# Patient Record
Sex: MALE | Race: WHITE | NOT HISPANIC OR LATINO | URBAN - METROPOLITAN AREA
[De-identification: names, ages, dates, MRNs, and addresses within clinical notes are randomized per-mention and may not be internally consistent; named-entity substitution may affect disease eponyms.]

---

## 2017-12-06 ENCOUNTER — EMERGENCY (EMERGENCY)
Facility: HOSPITAL | Age: 62
LOS: 1 days | Discharge: HOME | End: 2017-12-06
Attending: EMERGENCY MEDICINE
Payer: COMMERCIAL

## 2017-12-06 VITALS
RESPIRATION RATE: 16 BRPM | HEIGHT: 76 IN | WEIGHT: 225.09 LBS | DIASTOLIC BLOOD PRESSURE: 96 MMHG | OXYGEN SATURATION: 95 % | SYSTOLIC BLOOD PRESSURE: 154 MMHG | HEART RATE: 84 BPM | TEMPERATURE: 98 F

## 2017-12-06 VITALS
OXYGEN SATURATION: 98 % | TEMPERATURE: 98 F | RESPIRATION RATE: 16 BRPM | SYSTOLIC BLOOD PRESSURE: 121 MMHG | HEART RATE: 74 BPM | DIASTOLIC BLOOD PRESSURE: 79 MMHG

## 2017-12-06 LAB
ALBUMIN SERPL ELPH-MCNC: 2.8 G/DL — LOW (ref 3.5–5)
ALP SERPL-CCNC: 78 U/L — SIGNIFICANT CHANGE UP (ref 40–120)
ALT FLD-CCNC: 30 U/L DA — SIGNIFICANT CHANGE UP (ref 10–60)
ANION GAP SERPL CALC-SCNC: 8 MMOL/L — SIGNIFICANT CHANGE UP (ref 5–17)
APPEARANCE UR: CLEAR — SIGNIFICANT CHANGE UP
AST SERPL-CCNC: 32 U/L — SIGNIFICANT CHANGE UP (ref 10–40)
BILIRUB SERPL-MCNC: 1.7 MG/DL — HIGH (ref 0.2–1.2)
BILIRUB UR-MCNC: ABNORMAL
BUN SERPL-MCNC: 18 MG/DL — SIGNIFICANT CHANGE UP (ref 7–18)
CALCIUM SERPL-MCNC: 8.7 MG/DL — SIGNIFICANT CHANGE UP (ref 8.4–10.5)
CHLORIDE SERPL-SCNC: 109 MMOL/L — HIGH (ref 96–108)
CO2 SERPL-SCNC: 23 MMOL/L — SIGNIFICANT CHANGE UP (ref 22–31)
COLOR SPEC: ABNORMAL
CREAT SERPL-MCNC: 1.05 MG/DL — SIGNIFICANT CHANGE UP (ref 0.5–1.3)
DIFF PNL FLD: ABNORMAL
GLUCOSE SERPL-MCNC: 122 MG/DL — HIGH (ref 70–99)
GLUCOSE UR QL: NEGATIVE — SIGNIFICANT CHANGE UP
HCT VFR BLD CALC: 46.7 % — SIGNIFICANT CHANGE UP (ref 39–50)
HGB BLD-MCNC: 15.6 G/DL — SIGNIFICANT CHANGE UP (ref 13–17)
KETONES UR-MCNC: ABNORMAL
LEUKOCYTE ESTERASE UR-ACNC: ABNORMAL
MCHC RBC-ENTMCNC: 33.2 PG — SIGNIFICANT CHANGE UP (ref 27–34)
MCHC RBC-ENTMCNC: 33.4 GM/DL — SIGNIFICANT CHANGE UP (ref 32–36)
MCV RBC AUTO: 99.2 FL — SIGNIFICANT CHANGE UP (ref 80–100)
NITRITE UR-MCNC: NEGATIVE — SIGNIFICANT CHANGE UP
PH UR: 6 — SIGNIFICANT CHANGE UP (ref 5–8)
PLATELET # BLD AUTO: 106 K/UL — LOW (ref 150–400)
POTASSIUM SERPL-MCNC: 3.9 MMOL/L — SIGNIFICANT CHANGE UP (ref 3.5–5.3)
POTASSIUM SERPL-SCNC: 3.9 MMOL/L — SIGNIFICANT CHANGE UP (ref 3.5–5.3)
PROT SERPL-MCNC: 6.6 G/DL — SIGNIFICANT CHANGE UP (ref 6–8.3)
PROT UR-MCNC: 100
RBC # BLD: 4.7 M/UL — SIGNIFICANT CHANGE UP (ref 4.2–5.8)
RBC # FLD: 12.2 % — SIGNIFICANT CHANGE UP (ref 10.3–14.5)
SODIUM SERPL-SCNC: 140 MMOL/L — SIGNIFICANT CHANGE UP (ref 135–145)
SP GR SPEC: 1.02 — SIGNIFICANT CHANGE UP (ref 1.01–1.02)
UROBILINOGEN FLD QL: 8
WBC # BLD: 10.9 K/UL — HIGH (ref 3.8–10.5)
WBC # FLD AUTO: 10.9 K/UL — HIGH (ref 3.8–10.5)

## 2017-12-06 PROCEDURE — 87086 URINE CULTURE/COLONY COUNT: CPT

## 2017-12-06 PROCEDURE — 96374 THER/PROPH/DIAG INJ IV PUSH: CPT

## 2017-12-06 PROCEDURE — 81001 URINALYSIS AUTO W/SCOPE: CPT

## 2017-12-06 PROCEDURE — 74176 CT ABD & PELVIS W/O CONTRAST: CPT | Mod: 26

## 2017-12-06 PROCEDURE — 74176 CT ABD & PELVIS W/O CONTRAST: CPT

## 2017-12-06 PROCEDURE — 51702 INSERT TEMP BLADDER CATH: CPT

## 2017-12-06 PROCEDURE — 85027 COMPLETE CBC AUTOMATED: CPT

## 2017-12-06 PROCEDURE — 99284 EMERGENCY DEPT VISIT MOD MDM: CPT | Mod: 25

## 2017-12-06 PROCEDURE — 99285 EMERGENCY DEPT VISIT HI MDM: CPT | Mod: 25

## 2017-12-06 PROCEDURE — 80053 COMPREHEN METABOLIC PANEL: CPT

## 2017-12-06 RX ORDER — CEFTRIAXONE 500 MG/1
1 INJECTION, POWDER, FOR SOLUTION INTRAMUSCULAR; INTRAVENOUS ONCE
Qty: 0 | Refills: 0 | Status: COMPLETED | OUTPATIENT
Start: 2017-12-06 | End: 2017-12-06

## 2017-12-06 RX ORDER — CEFTRIAXONE 500 MG/1
1 INJECTION, POWDER, FOR SOLUTION INTRAMUSCULAR; INTRAVENOUS EVERY 24 HOURS
Qty: 0 | Refills: 0 | Status: DISCONTINUED | OUTPATIENT
Start: 2017-12-07 | End: 2017-12-10

## 2017-12-06 RX ORDER — MOXIFLOXACIN HYDROCHLORIDE TABLETS, 400 MG 400 MG/1
1 TABLET, FILM COATED ORAL
Qty: 14 | Refills: 1 | OUTPATIENT
Start: 2017-12-06 | End: 2017-12-19

## 2017-12-06 RX ORDER — CEFTRIAXONE 500 MG/1
INJECTION, POWDER, FOR SOLUTION INTRAMUSCULAR; INTRAVENOUS
Qty: 0 | Refills: 0 | Status: DISCONTINUED | OUTPATIENT
Start: 2017-12-06 | End: 2017-12-10

## 2017-12-06 RX ORDER — SODIUM CHLORIDE 9 MG/ML
1000 INJECTION INTRAMUSCULAR; INTRAVENOUS; SUBCUTANEOUS ONCE
Qty: 0 | Refills: 0 | Status: COMPLETED | OUTPATIENT
Start: 2017-12-06 | End: 2017-12-06

## 2017-12-06 RX ADMIN — SODIUM CHLORIDE 1000 MILLILITER(S): 9 INJECTION INTRAMUSCULAR; INTRAVENOUS; SUBCUTANEOUS at 04:35

## 2017-12-06 RX ADMIN — CEFTRIAXONE 100 GRAM(S): 500 INJECTION, POWDER, FOR SOLUTION INTRAMUSCULAR; INTRAVENOUS at 04:35

## 2017-12-06 NOTE — ED PROVIDER NOTE - MEDICAL DECISION MAKING DETAILS
Pt w/ urinary retention recently dx'd w/ UTI. Will do labs, UA, CT abd and pelvis to r/o stone. Will do prostate exam. Very well appearing, likely to go home w/ Urology follow up. Pt w/ urinary retention recently dx'd w/ UTI. Will do labs, UA, CT abd and pelvis to r/o stone.  Very well appearing, likely to go home w/ Urology follow up.

## 2017-12-06 NOTE — ED PROVIDER NOTE - OBJECTIVE STATEMENT
63 y/o M w/ no PMhx c/o urinary retention. Went to [facility] where he got a UA and was dx w/ UTI. He has taken 2 full days of Bactrim w/ no relief. Last void was 8 hours ago. Pt reports associated fever of 103. Pt denies nausea,vomiting,diarrhea, prostate issues. Had some difficulty w/ bowel movements. Pt denies any other complaints. NKDA.

## 2017-12-06 NOTE — ED PROVIDER NOTE - PROGRESS NOTE DETAILS
feeling much better after santos, no pain now, very well appearing, will d/c w leg bag/ follow up, cipro for UTI/prostatitis

## 2017-12-06 NOTE — ED ADULT NURSE NOTE - OBJECTIVE STATEMENT
Pt. is aox3 came to er accompanied by his wife c/o difficulty urinating. pt was seen by attending Kiser placed 600ml tea colored urine in bag. urine specimen sent to lab.

## 2017-12-06 NOTE — ED ADULT NURSE REASSESSMENT NOTE - NS ED NURSE REASSESS COMMENT FT1
pt. started on antibiotic therapy also 1 liter of ns in progress labs sent. pt tolerating antibiotic well no adverse reaction noted

## 2017-12-07 LAB
CULTURE RESULTS: NO GROWTH — SIGNIFICANT CHANGE UP
SPECIMEN SOURCE: SIGNIFICANT CHANGE UP

## 2022-07-27 ENCOUNTER — OFFICE (OUTPATIENT)
Dept: URBAN - METROPOLITAN AREA CLINIC 92 | Facility: CLINIC | Age: 67
Setting detail: OPHTHALMOLOGY
End: 2022-07-27
Payer: MEDICARE

## 2022-07-27 DIAGNOSIS — H25.13: ICD-10-CM

## 2022-07-27 DIAGNOSIS — H35.40: ICD-10-CM

## 2022-07-27 DIAGNOSIS — H52.7: ICD-10-CM

## 2022-07-27 DIAGNOSIS — H43.812: ICD-10-CM

## 2022-07-27 PROCEDURE — 92250 FUNDUS PHOTOGRAPHY W/I&R: CPT | Performed by: OPHTHALMOLOGY

## 2022-07-27 PROCEDURE — 99204 OFFICE O/P NEW MOD 45 MIN: CPT | Performed by: OPHTHALMOLOGY

## 2022-07-27 ASSESSMENT — REFRACTION_MANIFEST
OD_ADD: +2.50
OS_AXIS: 20
OD_CYLINDER: +1.00
OS_ADD: +2.50
OS_VA1: 20/20
OD_AXIS: 15
OS_SPHERE: -4.00
OD_VA1: 20/20
OS_CYLINDER: +0.50
OD_SPHERE: -2.25

## 2022-07-27 ASSESSMENT — REFRACTION_AUTOREFRACTION
OD_CYLINDER: +1.25
OD_SPHERE: -2.50
OS_AXIS: 23
OD_AXIS: 15
OS_CYLINDER: +0.50
OS_SPHERE: -5.00

## 2022-07-27 ASSESSMENT — VISUAL ACUITY
OD_BCVA: 20/100
OS_BCVA: 20/20

## 2022-07-27 ASSESSMENT — REFRACTION_CURRENTRX
OD_CYLINDER: +0.75
OS_AXIS: 10
OS_CYLINDER: +1.00
OD_SPHERE: -2.00
OS_SPHERE: -1.25
OS_SPHERE: -1.25
OS_OVR_VA: 20/
OS_AXIS: 173
OD_OVR_VA: 20/
OS_CYLINDER: +0.75
OD_OVR_VA: 20/
OD_SPHERE: -3.50
OD_AXIS: 178
OD_AXIS: 14
OS_OVR_VA: 20/
OD_CYLINDER: +1.25

## 2022-07-27 ASSESSMENT — KERATOMETRY
OD_K1POWER_DIOPTERS: 42.25
OS_AXISANGLE_DEGREES: 69
OD_AXISANGLE_DEGREES: 53
OS_K1POWER_DIOPTERS: 42.00
OS_K2POWER_DIOPTERS: 42.50
OD_K2POWER_DIOPTERS: 42.50

## 2022-07-27 ASSESSMENT — AXIALLENGTH_DERIVED
OS_AL: 25.6822
OD_AL: 24.7939
OD_AL: 24.7402
OS_AL: 26.1523

## 2022-07-27 ASSESSMENT — SPHEQUIV_DERIVED
OS_SPHEQUIV: -4.75
OD_SPHEQUIV: -1.75
OS_SPHEQUIV: -3.75
OD_SPHEQUIV: -1.875

## 2022-07-27 ASSESSMENT — CONFRONTATIONAL VISUAL FIELD TEST (CVF)
OS_FINDINGS: FULL
OD_FINDINGS: FULL

## 2022-07-27 ASSESSMENT — TONOMETRY
OD_IOP_MMHG: 12
OS_IOP_MMHG: 14

## 2023-02-17 ENCOUNTER — OFFICE (OUTPATIENT)
Dept: URBAN - METROPOLITAN AREA CLINIC 92 | Facility: CLINIC | Age: 68
Setting detail: OPHTHALMOLOGY
End: 2023-02-17
Payer: MEDICARE

## 2023-02-17 DIAGNOSIS — H25.13: ICD-10-CM

## 2023-02-17 DIAGNOSIS — H43.812: ICD-10-CM

## 2023-02-17 DIAGNOSIS — H35.40: ICD-10-CM

## 2023-02-17 DIAGNOSIS — H25.12: ICD-10-CM

## 2023-02-17 DIAGNOSIS — H25.11: ICD-10-CM

## 2023-02-17 PROCEDURE — 92014 COMPRE OPH EXAM EST PT 1/>: CPT | Performed by: SPECIALIST

## 2023-02-17 PROCEDURE — 92136 OPHTHALMIC BIOMETRY: CPT | Performed by: SPECIALIST

## 2023-02-17 ASSESSMENT — REFRACTION_MANIFEST
OS_VA1: 20/20
OS_SPHERE: -4.00
OD_CYLINDER: +1.00
OS_AXIS: 20
OS_ADD: +2.50
OD_ADD: +2.50
OD_SPHERE: -2.25
OS_CYLINDER: +0.50
OD_VA1: 20/20
OD_AXIS: 15

## 2023-02-17 ASSESSMENT — KERATOMETRY
OD_AXISANGLE_DEGREES: 143
OS_CYLPOWER_DEGREES: 0.5
OD_K1K2_AVERAGE: 42.375
OD_AXISANGLE_DEGREES: 53
OD_AXISANGLE2_DEGREES: 53
METHOD_AUTO_MANUAL: AUTO
OS_K1POWER_DIOPTERS: 42.00
OD_K2POWER_DIOPTERS: 42.50
OS_AXISANGLE_DEGREES: 64
OS_K2POWER_DIOPTERS: 42.50
OS_AXISANGLE_DEGREES: 154
OS_K2POWER_DIOPTERS: 42.50
OS_K1POWER_DIOPTERS: 42.00
OS_AXISANGLE2_DEGREES: 64
OD_K2POWER_DIOPTERS: 42.50
OS_CYLAXISANGLE_DEGREES: 64
OD_CYLAXISANGLE_DEGREES: 53
OS_K1K2_AVERAGE: 42.25
OD_CYLPOWER_DEGREES: 0.25
OD_K1POWER_DIOPTERS: 42.25
OD_K1POWER_DIOPTERS: 42.25

## 2023-02-17 ASSESSMENT — REFRACTION_CURRENTRX
OS_SPHERE: -1.25
OS_CYLINDER: +1.00
OS_AXIS: 173
OD_SPHERE: -3.50
OD_CYLINDER: +0.75
OD_AXIS: 14
OD_CYLINDER: +1.25
OD_OVR_VA: 20/
OS_OVR_VA: 20/
OS_OVR_VA: 20/
OD_OVR_VA: 20/
OS_CYLINDER: +0.75
OS_AXIS: 10
OS_SPHERE: -1.25
OD_AXIS: 178
OD_SPHERE: -2.00

## 2023-02-17 ASSESSMENT — REFRACTION_AUTOREFRACTION
OD_CYLINDER: +1.25
OS_SPHERE: -5.25
OD_AXIS: 11
OD_SPHERE: -3.00
OS_CYLINDER: +0.50
OS_AXIS: 07

## 2023-02-17 ASSESSMENT — AXIALLENGTH_DERIVED
OS_AL: 25.6822
OD_AL: 25.0109
OS_AL: 26.2726
OD_AL: 24.7402

## 2023-02-17 ASSESSMENT — VISUAL ACUITY
OS_BCVA: 20/30-1
OD_BCVA: 20/100

## 2023-02-17 ASSESSMENT — TONOMETRY
OD_IOP_MMHG: 17
OS_IOP_MMHG: 18

## 2023-02-17 ASSESSMENT — SPHEQUIV_DERIVED
OS_SPHEQUIV: -3.75
OD_SPHEQUIV: -1.75
OD_SPHEQUIV: -2.375
OS_SPHEQUIV: -5

## 2023-02-17 ASSESSMENT — CONFRONTATIONAL VISUAL FIELD TEST (CVF)
OS_FINDINGS: FULL
OD_FINDINGS: FULL

## 2023-04-04 ENCOUNTER — OFFICE (OUTPATIENT)
Dept: URBAN - METROPOLITAN AREA CLINIC 92 | Facility: CLINIC | Age: 68
Setting detail: OPHTHALMOLOGY
End: 2023-04-04
Payer: MEDICARE

## 2023-04-04 DIAGNOSIS — H25.13: ICD-10-CM

## 2023-04-04 PROCEDURE — 99441 TELEPHONE EVALUATION AND MANAGEMENT SERVICE, FOR AN ESTABLISHED PATIENT, 5-10 MINUTES OF MEDICAL DISCUSSION: CPT | Performed by: SPECIALIST

## 2023-04-12 ENCOUNTER — AMBULATORY SURGERY CENTER (OUTPATIENT)
Dept: URBAN - METROPOLITAN AREA CLINIC 75 | Facility: CLINIC | Age: 68
Setting detail: OPHTHALMOLOGY
End: 2023-04-12
Payer: MEDICARE

## 2023-04-12 DIAGNOSIS — H25.12: ICD-10-CM

## 2023-04-12 PROCEDURE — 66984 XCAPSL CTRC RMVL W/O ECP: CPT | Performed by: SPECIALIST

## 2023-04-13 ENCOUNTER — RX ONLY (RX ONLY)
Age: 68
End: 2023-04-13

## 2023-04-13 ENCOUNTER — OFFICE (OUTPATIENT)
Dept: URBAN - METROPOLITAN AREA CLINIC 92 | Facility: CLINIC | Age: 68
Setting detail: OPHTHALMOLOGY
End: 2023-04-13
Payer: MEDICARE

## 2023-04-13 DIAGNOSIS — Z96.1: ICD-10-CM

## 2023-04-13 PROCEDURE — 99024 POSTOP FOLLOW-UP VISIT: CPT | Performed by: SPECIALIST

## 2023-04-13 ASSESSMENT — REFRACTION_AUTOREFRACTION
OS_AXIS: 07
OS_CYLINDER: +0.50
OS_SPHERE: -5.25
OD_CYLINDER: +1.25
OD_AXIS: 11
OD_SPHERE: -3.00

## 2023-04-13 ASSESSMENT — SPHEQUIV_DERIVED
OD_SPHEQUIV: -1.75
OS_SPHEQUIV: -3.75
OD_SPHEQUIV: -2.375
OS_SPHEQUIV: -5

## 2023-04-13 ASSESSMENT — REFRACTION_CURRENTRX
OS_SPHERE: -1.25
OS_SPHERE: -1.25
OD_CYLINDER: +1.25
OD_OVR_VA: 20/
OD_AXIS: 14
OS_AXIS: 173
OS_CYLINDER: +0.75
OD_SPHERE: -3.50
OS_OVR_VA: 20/
OS_CYLINDER: +1.00
OD_OVR_VA: 20/
OD_SPHERE: -2.00
OS_OVR_VA: 20/
OD_CYLINDER: +0.75
OS_AXIS: 10
OD_AXIS: 178

## 2023-04-13 ASSESSMENT — REFRACTION_MANIFEST
OD_VA1: 20/20
OD_AXIS: 15
OD_SPHERE: -2.25
OS_ADD: +2.50
OS_VA1: 20/20
OS_CYLINDER: +0.50
OD_CYLINDER: +1.00
OS_SPHERE: -4.00
OD_ADD: +2.50
OS_AXIS: 20

## 2023-04-13 ASSESSMENT — VISUAL ACUITY
OS_BCVA: 20/30-1
OD_BCVA: 20/25

## 2023-04-13 ASSESSMENT — KERATOMETRY
OD_K1POWER_DIOPTERS: 42.25
OD_AXISANGLE_DEGREES: 53
OS_K2POWER_DIOPTERS: 42.50
OS_AXISANGLE_DEGREES: 64
OD_K2POWER_DIOPTERS: 42.50
OS_K1POWER_DIOPTERS: 42.00
METHOD_AUTO_MANUAL: AUTO

## 2023-04-13 ASSESSMENT — CORNEAL EDEMA - FOLDS/STRIAE: OS_FOLDSSTRIAE: T

## 2023-04-13 ASSESSMENT — AXIALLENGTH_DERIVED
OS_AL: 26.2726
OD_AL: 24.7402
OD_AL: 25.0109
OS_AL: 25.6822

## 2023-04-17 ENCOUNTER — OFFICE (OUTPATIENT)
Dept: URBAN - METROPOLITAN AREA CLINIC 92 | Facility: CLINIC | Age: 68
Setting detail: OPHTHALMOLOGY
End: 2023-04-17
Payer: MEDICARE

## 2023-04-17 DIAGNOSIS — Z96.1: ICD-10-CM

## 2023-04-17 PROCEDURE — 99024 POSTOP FOLLOW-UP VISIT: CPT | Performed by: SPECIALIST

## 2023-04-17 ASSESSMENT — SPHEQUIV_DERIVED
OD_SPHEQUIV: -1.75
OD_SPHEQUIV: -2.5
OS_SPHEQUIV: -0.125
OS_SPHEQUIV: -3.75

## 2023-04-17 ASSESSMENT — REFRACTION_AUTOREFRACTION
OD_CYLINDER: +1.00
OS_AXIS: 139
OS_SPHERE: -0.25
OD_AXIS: 013
OD_SPHERE: -3.00
OS_CYLINDER: +0.25

## 2023-04-17 ASSESSMENT — REFRACTION_MANIFEST
OS_CYLINDER: +0.50
OD_AXIS: 15
OS_SPHERE: -4.00
OS_AXIS: 20
OD_CYLINDER: +1.00
OD_VA1: 20/20
OD_SPHERE: -2.25
OS_ADD: +2.50
OS_VA1: 20/20
OD_ADD: +2.50

## 2023-04-17 ASSESSMENT — REFRACTION_CURRENTRX
OD_OVR_VA: 20/
OS_CYLINDER: +1.00
OS_OVR_VA: 20/
OS_SPHERE: -1.25
OS_OVR_VA: 20/
OD_SPHERE: -3.50
OD_OVR_VA: 20/
OD_CYLINDER: +1.25
OD_CYLINDER: +0.75
OS_SPHERE: -1.25
OD_AXIS: 178
OD_AXIS: 14
OS_CYLINDER: +0.75
OD_SPHERE: -2.00
OS_AXIS: 173
OS_AXIS: 10

## 2023-04-17 ASSESSMENT — AXIALLENGTH_DERIVED
OS_AL: 25.6822
OS_AL: 24.1109
OD_AL: 24.7402
OD_AL: 25.0658

## 2023-04-17 ASSESSMENT — KERATOMETRY
OS_K1POWER_DIOPTERS: 42.00
OD_K1POWER_DIOPTERS: 42.25
METHOD_AUTO_MANUAL: AUTO
OS_AXISANGLE_DEGREES: 086
OS_K2POWER_DIOPTERS: 42.50
OD_K2POWER_DIOPTERS: 42.50
OD_AXISANGLE_DEGREES: 054

## 2023-04-17 ASSESSMENT — VISUAL ACUITY
OS_BCVA: 20/30-1
OD_BCVA: 20/20-

## 2023-04-17 ASSESSMENT — CORNEAL EDEMA - FOLDS/STRIAE: OS_FOLDSSTRIAE: T

## 2023-04-26 ENCOUNTER — AMBULATORY SURGERY CENTER (OUTPATIENT)
Dept: URBAN - METROPOLITAN AREA CLINIC 75 | Facility: CLINIC | Age: 68
Setting detail: OPHTHALMOLOGY
End: 2023-04-26
Payer: MEDICARE

## 2023-04-26 DIAGNOSIS — H25.11: ICD-10-CM

## 2023-04-26 PROCEDURE — 66984 XCAPSL CTRC RMVL W/O ECP: CPT | Performed by: SPECIALIST

## 2023-04-27 ENCOUNTER — RX ONLY (RX ONLY)
Age: 68
End: 2023-04-27

## 2023-04-27 ENCOUNTER — OFFICE (OUTPATIENT)
Dept: URBAN - METROPOLITAN AREA CLINIC 92 | Facility: CLINIC | Age: 68
Setting detail: OPHTHALMOLOGY
End: 2023-04-27
Payer: MEDICARE

## 2023-04-27 DIAGNOSIS — Z96.1: ICD-10-CM

## 2023-04-27 PROCEDURE — 99024 POSTOP FOLLOW-UP VISIT: CPT | Performed by: SPECIALIST

## 2023-04-27 ASSESSMENT — SPHEQUIV_DERIVED
OD_SPHEQUIV: -2.5
OD_SPHEQUIV: -1.75
OS_SPHEQUIV: -3.75
OS_SPHEQUIV: -0.125

## 2023-04-27 ASSESSMENT — REFRACTION_CURRENTRX
OD_SPHERE: -3.50
OS_CYLINDER: +0.75
OS_CYLINDER: +1.00
OS_SPHERE: -1.25
OS_AXIS: 173
OD_AXIS: 14
OD_OVR_VA: 20/
OD_AXIS: 178
OD_CYLINDER: +1.25
OS_OVR_VA: 20/
OS_SPHERE: -1.25
OS_OVR_VA: 20/
OD_CYLINDER: +0.75
OD_SPHERE: -2.00
OD_OVR_VA: 20/
OS_AXIS: 10

## 2023-04-27 ASSESSMENT — REFRACTION_MANIFEST
OD_SPHERE: -2.25
OS_ADD: +2.50
OD_ADD: +2.50
OD_VA1: 20/20
OS_SPHERE: -4.00
OD_CYLINDER: +1.00
OD_AXIS: 15
OS_CYLINDER: +0.50
OS_VA1: 20/20
OS_AXIS: 20

## 2023-04-27 ASSESSMENT — KERATOMETRY
OS_K2POWER_DIOPTERS: 42.50
OD_K2POWER_DIOPTERS: 42.50
METHOD_AUTO_MANUAL: AUTO
OS_K1POWER_DIOPTERS: 42.00
OD_AXISANGLE_DEGREES: 054
OS_AXISANGLE_DEGREES: 086
OD_K1POWER_DIOPTERS: 42.25

## 2023-04-27 ASSESSMENT — VISUAL ACUITY
OS_BCVA: 20/20
OD_BCVA: 20/20

## 2023-04-27 ASSESSMENT — REFRACTION_AUTOREFRACTION
OS_AXIS: 139
OS_SPHERE: -0.25
OS_CYLINDER: +0.25
OD_CYLINDER: +1.00
OD_AXIS: 013
OD_SPHERE: -3.00

## 2023-04-27 ASSESSMENT — AXIALLENGTH_DERIVED
OD_AL: 24.7402
OD_AL: 25.0658
OS_AL: 24.1109
OS_AL: 25.6822

## 2023-04-27 ASSESSMENT — CORNEAL EDEMA - FOLDS/STRIAE: OS_FOLDSSTRIAE: T

## 2023-04-27 ASSESSMENT — TONOMETRY: OD_IOP_MMHG: 19

## 2023-04-28 PROBLEM — H25.13 CATARACT SENILE NUCLEAR SCLEROSIS: Status: ACTIVE | Noted: 2023-04-04

## 2023-04-28 ASSESSMENT — KERATOMETRY
OS_K2POWER_DIOPTERS: 42.50
METHOD_AUTO_MANUAL: AUTO
OS_K1POWER_DIOPTERS: 42.00
OD_K2POWER_DIOPTERS: 42.50
OD_AXISANGLE_DEGREES: 054
OD_K1POWER_DIOPTERS: 42.25
OS_AXISANGLE_DEGREES: 086

## 2023-04-28 ASSESSMENT — REFRACTION_AUTOREFRACTION
OD_SPHERE: -3.00
OD_CYLINDER: +1.00
OD_AXIS: 013
OS_CYLINDER: +0.25
OS_AXIS: 139
OS_SPHERE: -0.25

## 2023-04-28 ASSESSMENT — SPHEQUIV_DERIVED
OD_SPHEQUIV: -2.5
OS_SPHEQUIV: -0.125

## 2023-04-28 ASSESSMENT — VISUAL ACUITY
OD_BCVA: 20/20
OS_BCVA: 20/20

## 2023-04-28 ASSESSMENT — AXIALLENGTH_DERIVED
OD_AL: 25.0658
OS_AL: 24.1109

## 2023-05-01 ENCOUNTER — OFFICE (OUTPATIENT)
Dept: URBAN - METROPOLITAN AREA CLINIC 92 | Facility: CLINIC | Age: 68
Setting detail: OPHTHALMOLOGY
End: 2023-05-01
Payer: MEDICARE

## 2023-05-01 DIAGNOSIS — Z96.1: ICD-10-CM

## 2023-05-01 PROCEDURE — 99024 POSTOP FOLLOW-UP VISIT: CPT | Performed by: SPECIALIST

## 2023-05-01 ASSESSMENT — REFRACTION_CURRENTRX
OS_CYLINDER: +1.00
OS_SPHERE: -1.25
OS_OVR_VA: 20/
OD_SPHERE: -2.00
OD_CYLINDER: +1.25
OD_AXIS: 14
OD_CYLINDER: +0.75
OS_SPHERE: -1.25
OD_AXIS: 178
OS_AXIS: 173
OS_CYLINDER: +0.75
OD_OVR_VA: 20/
OD_SPHERE: -3.50
OD_OVR_VA: 20/
OS_AXIS: 10
OS_OVR_VA: 20/

## 2023-05-01 ASSESSMENT — KERATOMETRY
OD_K1POWER_DIOPTERS: 41.75
OD_AXISANGLE_DEGREES: 059
OS_K2POWER_DIOPTERS: 44.00
OD_K2POWER_DIOPTERS: 42.25
METHOD_AUTO_MANUAL: AUTO
OS_K1POWER_DIOPTERS: 43.50
OS_AXISANGLE_DEGREES: 079

## 2023-05-01 ASSESSMENT — VISUAL ACUITY
OS_BCVA: 20/20-2
OD_BCVA: 20/20

## 2023-05-01 ASSESSMENT — REFRACTION_AUTOREFRACTION
OS_AXIS: 173
OS_SPHERE: -0.25
OS_CYLINDER: +0.25
OD_AXIS: 023
OD_SPHERE: -0.50
OD_CYLINDER: +0.75

## 2023-05-01 ASSESSMENT — REFRACTION_MANIFEST
OS_AXIS: 20
OD_SPHERE: -2.25
OS_CYLINDER: +0.50
OD_VA1: 20/20
OS_ADD: +2.50
OS_SPHERE: -4.00
OD_AXIS: 15
OD_ADD: +2.50
OS_VA1: 20/20
OD_CYLINDER: +1.00

## 2023-05-01 ASSESSMENT — CORNEAL EDEMA - FOLDS/STRIAE: OS_FOLDSSTRIAE: T

## 2023-05-01 ASSESSMENT — AXIALLENGTH_DERIVED
OS_AL: 25.0455
OD_AL: 24.2072
OS_AL: 23.5489
OD_AL: 24.8926

## 2023-05-01 ASSESSMENT — SPHEQUIV_DERIVED
OD_SPHEQUIV: -1.75
OS_SPHEQUIV: -3.75
OS_SPHEQUIV: -0.125
OD_SPHEQUIV: -0.125

## 2023-05-01 ASSESSMENT — CONFRONTATIONAL VISUAL FIELD TEST (CVF)
OD_FINDINGS: FULL
OS_FINDINGS: FULL

## 2023-06-09 ENCOUNTER — OFFICE (OUTPATIENT)
Dept: URBAN - METROPOLITAN AREA CLINIC 92 | Facility: CLINIC | Age: 68
Setting detail: OPHTHALMOLOGY
End: 2023-06-09
Payer: MEDICARE

## 2023-06-09 DIAGNOSIS — Z96.1: ICD-10-CM

## 2023-06-09 DIAGNOSIS — H43.813: ICD-10-CM

## 2023-06-09 DIAGNOSIS — H52.4: ICD-10-CM

## 2023-06-09 PROCEDURE — 99024 POSTOP FOLLOW-UP VISIT: CPT | Performed by: SPECIALIST

## 2023-06-09 PROCEDURE — 92015 DETERMINE REFRACTIVE STATE: CPT | Performed by: SPECIALIST

## 2023-06-09 ASSESSMENT — SPHEQUIV_DERIVED
OD_SPHEQUIV: 0.125
OS_SPHEQUIV: -3.75
OD_SPHEQUIV: -1.75
OS_SPHEQUIV: 0.125

## 2023-06-09 ASSESSMENT — KERATOMETRY
OS_K2POWER_DIOPTERS: 042.25
METHOD_AUTO_MANUAL: AUTO
OD_K1POWER_DIOPTERS: 42.25
OS_AXISANGLE_DEGREES: 060
OD_AXISANGLE_DEGREES: 066
OD_K2POWER_DIOPTERS: 42.50
OS_K1POWER_DIOPTERS: 42.00

## 2023-06-09 ASSESSMENT — REFRACTION_MANIFEST
OD_CYLINDER: +1.00
OS_AXIS: 20
OS_SPHERE: PLANO
OD_SPHERE: PLANO
OD_VA1: 20/20
OD_ADD: +2.00
OD_SPHERE: -2.25
OS_VA1: 20/20
OS_ADD: +2.50
OS_CYLINDER: +0.50
OD_ADD: +2.50
OS_CYLINDER: SPH
OD_AXIS: 15
OS_SPHERE: -4.00
OS_ADD: +2.00
OD_CYLINDER: SPH

## 2023-06-09 ASSESSMENT — REFRACTION_AUTOREFRACTION
OD_SPHERE: -0.25
OS_SPHERE: 0.00
OS_CYLINDER: +0.25
OS_AXIS: 179
OD_CYLINDER: +0.75
OD_AXIS: 012

## 2023-06-09 ASSESSMENT — REFRACTION_CURRENTRX
OS_CYLINDER: +1.00
OS_OVR_VA: 20/
OS_OVR_VA: 20/
OD_AXIS: 14
OD_CYLINDER: +0.75
OD_SPHERE: -3.50
OD_OVR_VA: 20/
OS_SPHERE: -1.25
OS_AXIS: 10
OD_OVR_VA: 20/
OD_SPHERE: -2.00
OD_CYLINDER: +1.25
OS_SPHERE: -1.25
OS_CYLINDER: +0.75
OD_AXIS: 178
OS_AXIS: 173

## 2023-06-09 ASSESSMENT — AXIALLENGTH_DERIVED
OD_AL: 24.7402
OD_AL: 23.9621
OS_AL: 25.7367
OS_AL: 24.0573

## 2023-06-09 ASSESSMENT — VISUAL ACUITY
OS_BCVA: 20/20
OD_BCVA: 20/20

## 2024-01-11 ENCOUNTER — OFFICE (OUTPATIENT)
Dept: URBAN - METROPOLITAN AREA CLINIC 92 | Facility: CLINIC | Age: 69
Setting detail: OPHTHALMOLOGY
End: 2024-01-11
Payer: MEDICARE

## 2024-01-11 DIAGNOSIS — D31.32: ICD-10-CM

## 2024-01-11 DIAGNOSIS — H43.813: ICD-10-CM

## 2024-01-11 DIAGNOSIS — H43.393: ICD-10-CM

## 2024-01-11 PROCEDURE — 92014 COMPRE OPH EXAM EST PT 1/>: CPT | Performed by: OPHTHALMOLOGY

## 2024-01-11 PROCEDURE — 92134 CPTRZ OPH DX IMG PST SGM RTA: CPT | Performed by: OPHTHALMOLOGY

## 2024-01-11 ASSESSMENT — REFRACTION_MANIFEST
OS_CYLINDER: +0.50
OS_CYLINDER: SPH
OS_ADD: +2.00
OD_SPHERE: -2.25
OS_ADD: +2.50
OD_SPHERE: PLANO
OS_AXIS: 20
OS_VA1: 20/20
OD_AXIS: 15
OD_ADD: +2.00
OD_VA1: 20/20
OS_SPHERE: PLANO
OD_ADD: +2.50
OD_CYLINDER: SPH
OS_SPHERE: -4.00
OD_CYLINDER: +1.00

## 2024-01-11 ASSESSMENT — REFRACTION_CURRENTRX
OD_SPHERE: -3.50
OD_OVR_VA: 20/
OD_AXIS: 178
OS_AXIS: 173
OS_CYLINDER: +1.00
OD_OVR_VA: 20/
OD_SPHERE: -2.00
OS_OVR_VA: 20/
OS_SPHERE: -1.25
OS_SPHERE: -1.25
OD_AXIS: 14
OS_CYLINDER: +0.75
OD_CYLINDER: +1.25
OS_AXIS: 10
OD_CYLINDER: +0.75
OS_OVR_VA: 20/

## 2024-01-11 ASSESSMENT — SPHEQUIV_DERIVED
OS_SPHEQUIV: -3.75
OD_SPHEQUIV: -1.75
OS_SPHEQUIV: 0.125
OD_SPHEQUIV: 0.125

## 2024-01-11 ASSESSMENT — REFRACTION_AUTOREFRACTION
OS_AXIS: 179
OS_CYLINDER: +0.25
OD_SPHERE: -0.25
OD_CYLINDER: +0.75
OD_AXIS: 012
OS_SPHERE: 0.00

## 2024-01-11 ASSESSMENT — CONFRONTATIONAL VISUAL FIELD TEST (CVF)
OD_FINDINGS: FULL
OS_FINDINGS: FULL

## 2024-06-13 ENCOUNTER — OFFICE (OUTPATIENT)
Dept: URBAN - METROPOLITAN AREA CLINIC 92 | Facility: CLINIC | Age: 69
Setting detail: OPHTHALMOLOGY
End: 2024-06-13
Payer: MEDICARE

## 2024-06-13 DIAGNOSIS — H16.041: ICD-10-CM

## 2024-06-13 DIAGNOSIS — H35.40: ICD-10-CM

## 2024-06-13 DIAGNOSIS — Z96.1: ICD-10-CM

## 2024-06-13 DIAGNOSIS — D31.32: ICD-10-CM

## 2024-06-13 DIAGNOSIS — H26.493: ICD-10-CM

## 2024-06-13 DIAGNOSIS — H43.393: ICD-10-CM

## 2024-06-13 DIAGNOSIS — H43.813: ICD-10-CM

## 2024-06-13 PROCEDURE — 92014 COMPRE OPH EXAM EST PT 1/>: CPT | Performed by: SPECIALIST

## 2025-07-24 ENCOUNTER — OFFICE (OUTPATIENT)
Dept: URBAN - METROPOLITAN AREA CLINIC 92 | Facility: CLINIC | Age: 70
Setting detail: OPHTHALMOLOGY
End: 2025-07-24
Payer: MEDICARE

## 2025-07-24 DIAGNOSIS — H43.393: ICD-10-CM

## 2025-07-24 DIAGNOSIS — H43.813: ICD-10-CM

## 2025-07-24 DIAGNOSIS — H26.493: ICD-10-CM

## 2025-07-24 DIAGNOSIS — D31.32: ICD-10-CM

## 2025-07-24 DIAGNOSIS — H16.041: ICD-10-CM

## 2025-07-24 DIAGNOSIS — H35.40: ICD-10-CM

## 2025-07-24 DIAGNOSIS — Z96.1: ICD-10-CM

## 2025-07-24 PROCEDURE — 92014 COMPRE OPH EXAM EST PT 1/>: CPT | Performed by: SPECIALIST

## 2025-07-24 ASSESSMENT — REFRACTION_CURRENTRX
OS_SPHERE: -1.25
OS_AXIS: 173
OD_SPHERE: -3.50
OD_SPHERE: -2.00
OS_AXIS: 10
OS_SPHERE: -1.25
OD_AXIS: 14
OD_OVR_VA: 20/
OS_CYLINDER: +0.75
OS_OVR_VA: 20/
OD_CYLINDER: +0.75
OD_AXIS: 178
OS_OVR_VA: 20/
OS_CYLINDER: +1.00
OD_CYLINDER: +1.25
OD_OVR_VA: 20/

## 2025-07-24 ASSESSMENT — REFRACTION_MANIFEST
OD_VA1: 20/20
OD_CYLINDER: +1.00
OS_ADD: +2.00
OS_SPHERE: -4.00
OS_CYLINDER: +0.50
OD_SPHERE: -2.25
OS_AXIS: 20
OS_CYLINDER: SPH
OD_CYLINDER: SPH
OS_VA1: 20/20
OD_ADD: +2.50
OS_ADD: +2.50
OD_SPHERE: PLANO
OS_SPHERE: PLANO
OD_AXIS: 15
OD_ADD: +2.00

## 2025-07-24 ASSESSMENT — REFRACTION_AUTOREFRACTION
OD_SPHERE: -0.50
OS_SPHERE: -0.25
OD_CYLINDER: +1.00
OS_CYLINDER: +0.50
OS_AXIS: 158
OD_AXIS: 013

## 2025-07-24 ASSESSMENT — CONFRONTATIONAL VISUAL FIELD TEST (CVF)
OS_FINDINGS: FULL
OD_FINDINGS: FULL

## 2025-07-24 ASSESSMENT — TONOMETRY
OD_IOP_MMHG: 15
OS_IOP_MMHG: 15

## 2025-07-24 ASSESSMENT — VISUAL ACUITY
OS_BCVA: 20/25+2
OD_BCVA: 20/20-1

## 2025-07-24 ASSESSMENT — KERATOMETRY
OD_K2POWER_DIOPTERS: 42.75
OS_K1POWER_DIOPTERS: 42.00
OS_K2POWER_DIOPTERS: 42.25
OD_K1POWER_DIOPTERS: 42.25
METHOD_AUTO_MANUAL: AUTO
OD_AXISANGLE_DEGREES: 092
OS_AXISANGLE_DEGREES: 061